# Patient Record
Sex: MALE | Race: WHITE | Employment: FULL TIME | URBAN - METROPOLITAN AREA
[De-identification: names, ages, dates, MRNs, and addresses within clinical notes are randomized per-mention and may not be internally consistent; named-entity substitution may affect disease eponyms.]

---

## 2019-08-18 ENCOUNTER — HOSPITAL ENCOUNTER (EMERGENCY)
Facility: CLINIC | Age: 59
Discharge: HOME | End: 2019-08-18
Admitting: PHYSICIAN ASSISTANT
Payer: COMMERCIAL

## 2019-08-18 VITALS
SYSTOLIC BLOOD PRESSURE: 141 MMHG | HEIGHT: 69 IN | OXYGEN SATURATION: 93 % | WEIGHT: 315 LBS | TEMPERATURE: 98.5 F | DIASTOLIC BLOOD PRESSURE: 96 MMHG | HEART RATE: 94 BPM | BODY MASS INDEX: 46.65 KG/M2 | RESPIRATION RATE: 16 BRPM

## 2019-08-18 DIAGNOSIS — T78.2XXA ANAPHYLAXIS, INITIAL ENCOUNTER: ICD-10-CM

## 2019-08-18 PROCEDURE — 94640 AIRWAY INHALATION TREATMENT: CPT

## 2019-08-18 PROCEDURE — 96372 THER/PROPH/DIAG INJ SC/IM: CPT

## 2019-08-18 PROCEDURE — 25000125 ZZHC RX 250

## 2019-08-18 PROCEDURE — 25000132 ZZH RX MED GY IP 250 OP 250 PS 637: Performed by: PHYSICIAN ASSISTANT

## 2019-08-18 PROCEDURE — 25000131 ZZH RX MED GY IP 250 OP 636 PS 637: Performed by: PHYSICIAN ASSISTANT

## 2019-08-18 PROCEDURE — 99285 EMERGENCY DEPT VISIT HI MDM: CPT | Mod: 25

## 2019-08-18 RX ORDER — EPINEPHRINE 0.3 MG/.3ML
0.3 INJECTION SUBCUTANEOUS
Qty: 2 EACH | Refills: 0 | Status: SHIPPED | OUTPATIENT
Start: 2019-08-18

## 2019-08-18 RX ORDER — PREDNISONE 20 MG/1
40 TABLET ORAL ONCE
Status: COMPLETED | OUTPATIENT
Start: 2019-08-18 | End: 2019-08-18

## 2019-08-18 RX ORDER — FAMOTIDINE 20 MG/1
20 TABLET, FILM COATED ORAL 2 TIMES DAILY
Status: DISCONTINUED | OUTPATIENT
Start: 2019-08-18 | End: 2019-08-18

## 2019-08-18 RX ORDER — IPRATROPIUM BROMIDE AND ALBUTEROL SULFATE 2.5; .5 MG/3ML; MG/3ML
3 SOLUTION RESPIRATORY (INHALATION) ONCE
Status: COMPLETED | OUTPATIENT
Start: 2019-08-18 | End: 2019-08-18

## 2019-08-18 RX ORDER — LORAZEPAM 0.5 MG/1
0.5 TABLET ORAL ONCE
Status: COMPLETED | OUTPATIENT
Start: 2019-08-18 | End: 2019-08-18

## 2019-08-18 RX ORDER — IPRATROPIUM BROMIDE AND ALBUTEROL SULFATE 2.5; .5 MG/3ML; MG/3ML
SOLUTION RESPIRATORY (INHALATION)
Status: COMPLETED
Start: 2019-08-18 | End: 2019-08-18

## 2019-08-18 RX ORDER — PREDNISONE 20 MG/1
40 TABLET ORAL DAILY
Qty: 4 TABLET | Refills: 0 | Status: SHIPPED | OUTPATIENT
Start: 2019-08-18 | End: 2019-08-20

## 2019-08-18 RX ORDER — FAMOTIDINE 20 MG/1
20 TABLET, FILM COATED ORAL ONCE
Status: COMPLETED | OUTPATIENT
Start: 2019-08-18 | End: 2019-08-18

## 2019-08-18 RX ADMIN — EPINEPHRINE 0.3 MG: 1 INJECTION INTRAMUSCULAR; INTRAVENOUS; SUBCUTANEOUS at 18:57

## 2019-08-18 RX ADMIN — LORAZEPAM 0.5 MG: 0.5 TABLET ORAL at 19:08

## 2019-08-18 RX ADMIN — IPRATROPIUM BROMIDE AND ALBUTEROL SULFATE 3 ML: .5; 3 SOLUTION RESPIRATORY (INHALATION) at 19:01

## 2019-08-18 RX ADMIN — PREDNISONE 40 MG: 20 TABLET ORAL at 19:44

## 2019-08-18 RX ADMIN — FAMOTIDINE 20 MG: 20 TABLET, FILM COATED ORAL at 19:44

## 2019-08-18 RX ADMIN — IPRATROPIUM BROMIDE AND ALBUTEROL SULFATE 3 ML: 2.5; .5 SOLUTION RESPIRATORY (INHALATION) at 19:01

## 2019-08-18 ASSESSMENT — MIFFLIN-ST. JEOR: SCORE: 2239.21

## 2019-08-18 ASSESSMENT — ENCOUNTER SYMPTOMS
FACIAL SWELLING: 1
WHEEZING: 1
SHORTNESS OF BREATH: 1
VOMITING: 0
COLOR CHANGE: 1

## 2019-08-18 NOTE — ED AVS SNAPSHOT
Emergency Department  [address] [place] [address]  Phone:  [phone]  Fax:  [phone]                                    Srinivas Muñoz   MRN: [id number]    Department:   Emergency Department   Date of Visit:  8/18/2019           After Visit Summary Signature Page    I have received my discharge instructions, and my questions have been answered. I have discussed any challenges I see with this plan with the nurse or doctor.    ..........................................................................................................................................  Patient/Patient Representative Signature      ..........................................................................................................................................  Patient Representative Print Name and Relationship to Patient    ..................................................               ................................................  Date                                   Time    ..........................................................................................................................................  Reviewed by Signature/Title    ...................................................              ..............................................  Date                                               Time          22EPIC Rev 08/18

## 2019-08-18 NOTE — ED PROVIDER NOTES
History     Chief Complaint:  Allergic Reaction      HPI   Srinivas Muñoz is a 59 year old male diabetic who presents with an allergic reaction.  The patient states that approximately 1 hour ago he was scraping some paint at which time he began to develop some rash.  He subsequently developed some subjective swelling of the lips and throat with wheezing.  He took 4 Benadryl and his Zyrtec prior to coming however this did not significantly improve his symptoms.  He notes that in the past he has had prior allergic reactions when being around paint or dust, however in the past these have never been accompanied by anything besides a rash.  He notes that the swelling of the lips and wheezing are new.  Denies history of asthma.  He is not on any ACE or ARB or beta-blocker.  He denies vomiting, diarrhea, nausea.  He has seen an allergist before diagnosed with being allergic to dust and several other things which he cannot recall.  He was instructed to obtain an EpiPen and carry it but he has not done so.    Allergies:  No Known Drug Allergies      Medications:    Cipro  levocetrizine dihydrochloride  Metformin  Percocet      Past Medical History:    Allergic state  Diabetes  Coronary atherosclerosis   Pterygium of left eye  Chronic urticaria  Asthma    Past Surgical History:    Appendectomy  Vasectomy    Family History:    Diabetes  Stroke  Cancer    Social History:  Smoking Status: Never Smoker  Smokeless Tobacco: Never Used  Alcohol Use: Positive  Drug Use: Negative  PCP: [facility] Myles   Marital Status:          Review of Systems   HENT: Positive for facial swelling.    Respiratory: Positive for shortness of breath and wheezing.    Gastrointestinal: Negative for vomiting.   Skin: Positive for color change and rash.   All other systems reviewed and are negative.    Physical Exam     Patient Vitals for the past 24 hrs:   BP Temp Temp src Pulse Resp SpO2 Height Weight   08/18/19 1920 -- -- -- -- -- 93 % --  "--   08/18/19 1900 (!) 141/96 -- -- 94 -- 93 % -- --   08/18/19 1856 (!) 201/115 98.5  F (36.9  C) Oral 108 16 95 % 1.753 m (5' 9\") 142.9 kg (315 lb)        Physical Exam  General: Alert and cooperative with exam.  Anxious appearing.  Head:  Scalp is NC/AT  Eyes:  No scleral icterus, PERRL  ENT:  The external nose and ears are normal.     There is mild swelling of the lips.  Tongue is not swollen and no posterior oropharynx or uvular swelling.  No stridor in neck.  Neck:  Normal range of motion without rigidity.  CV:  Regular rate and rhythm    No pathologic murmur, rubs, or gallops.  Resp:  Minimal end expiratory wheezing.  Breath sounds otherwise clear bilaterally. Non-labored, no retractions or accessory muscle use  GI:  Abdomen is soft, no distension, no tenderness, no masses. No peritoneal signs.  Bowel sounds present in all quadrants  MS:  No lower extremity edema or asymmetric calf swelling.  Skin:  Diffuse urticarial rash.  Neuro: Oriented. No gross motor deficits.  Psych: Awake. Alert. Normal affect. Appropriate interactions.    Emergency Department Course     Interventions:  1857 epinephrine 0.3 mg IM  1901 Duoneb 3 mL nebulization  1908 Ativan 0.5 mg Oral  1944 Pepcid 20 mg Oral  1944 Deltasone 40 mg Oral    Emergency Department Course:    1853 Nursing notes and vitals reviewed.    1855 I performed an exam of the patient as documented above.     1945 Patient rechecked and updated.  Patient feeling much better. The patient's rash is gone.  Lip swelling and wheezing have also resolved.    2020 The patient is discharged to home.     Impression & Plan      Medical Decision Making:  Srinivas Muñoz is a 59 year old male who presents for signs and symptoms consistent with an allergic reaction.  Based on the severity of presentation, epinephrine was indicated.  Following the administration of epinephrine as well as the above listed medication, the patient was monitored in the emergency department with marked " improvement of all of his symptoms.  I did recommend keeping the patient for monitoring for 3 to 4 hours for rebound reaction however he declined this.  I discussed the risk associated with this, as rebound reactions are most common in the first few hours following administration of epinephrine, however he again declined this.  At the time of discharge, the patient does not have signs of airway compromise and is hemodynamically stable.  Instructions for the use of benadryl and/or zyrtec, H2 blocker, and prednisone were reviewed.  We discussed side effects of elevated blood sugar associated with prednisone and the patient states that his glucose has been very well controlled and he will monitor it well over the next several days.  Return precautions including oral swelling, difficulties breathing, chest pain, syncope were given.  Patient will follow up in approximately 1 week for failure to improve. Epi pen given.  He was driven home by his wife, given administration of Ativan here.      Diagnosis:    ICD-10-CM    1. Anaphylaxis, initial encounter T78.2XXA      Disposition:   Findings and plan explained to the Patient. Patient discharged home with instructions regarding supportive care, medications, and reasons to return. The importance of close follow-up was reviewed.      Discharge Medications:     Review of your medicines         START taking      Dose / Directions   EPINEPHrine 0.3 MG/0.3ML injection 2-pack  Commonly known as:  EPIPEN/ADRENACLICK/or ANY BX GENERIC EQUIV      Dose:  0.3 mg  Inject 0.3 mLs (0.3 mg) into the muscle once as needed for anaphylaxis  Quantity:  2 each  Refills:  0     predniSONE 20 MG tablet  Commonly known as:  DELTASONE      Dose:  40 mg  Take 2 tablets (40 mg) by mouth daily for 2 days  Quantity:  4 tablet  Refills:  0           Where to get your medicines      Some of these will need a paper prescription and others can be bought over the counter. Ask your nurse if you have  questions.    Bring a paper prescription for each of these medications    EPINEPHrine 0.3 MG/0.3ML injection 2-pack    predniSONE 20 MG tablet         Scribe Disclosure:  I, Ezequiel Alexis, am serving as a scribe at 7:05 PM on 8/18/2019 to document services personally performed by Hector Grover PA-C based on my observations and the provider's statements to me.        EMERGENCY DEPARTMENT       Hector Grover PA-C  08/18/19 [id number]

## 2019-08-19 NOTE — DISCHARGE INSTRUCTIONS
We are recommending that you stay for observation for 3 to 4 hours, however you are declining this at the present time. You understand that a rebound reaction could occur for which she should return immediately to the emergency department.      Discharge Instructions  Allergic Reaction    An allergic reaction can result in a rash, itching, swelling, watery eyes, or a runny nose. A serious reaction can cause swelling of your mouth or throat, or difficulty breathing (wheezing). The most serious allergy is called anaphylaxis, and can be life-threatening. Many allergies result in hives, also called urticaria.       An allergy happens when the body s natural defense system (immune system) overreacts to something. The thing that triggers your allergic reaction is called an allergen. The first time you are exposed to your allergen, you may not have any reaction, but the body makes a protein called an antibody. The antibody lets the body recognize and remember the allergen.  Every time you are exposed to your allergen you get more antibody and your reaction can be more severe.      Generally, every Emergency Department visit should have a follow-up clinic visit with either a primary or a specialty clinic/provider. Please follow-up as instructed by your emergency provider today.    Call 911 if you have:  Swelling of the lips, tongue or throat.  Hoarse voice, drooling or trouble breathing.  Chest pain or shortness of breath.  Fainting or unconsciousness.    What can I do to help myself?  If you know what caused your allergy, do not touch it, throw any of it away, and tell others not to have it around you. Wear a medical alert bracelet with a name of your allergen on it.  If you do not know what you are allergic to, keep a journal of everything that you are exposed to (foods, soaps, medicines, etc.). Take this with you when you follow up with your primary provider or specialist (Allergist). This may help determine what is  causing the allergic reaction.  Take any medicines that are prescribed.  Antihistamines can decrease rash or itching. You may use Benadryl  (diphenhydramine) for rash or itching according to package directions, or use a prescription antihistamine as recommended by your provider.  For significant allergic reactions, you may have been given a prescription for an epinephrine (adrenaline) auto injector. Carry this with you at all times! Use it if you are having any symptoms of anaphylaxis.  Do not be afraid to use it. Return to the Emergency Department if you use your auto injector, call 911 if it does not resolve the symptoms. It is only meant to buy time until you can get to the Emergency Department!  If you were given a prescription for medicine here today, be sure to read all of the information (including the package insert) that comes with your prescription.  This will include important information about the medicine, its side effects, and any warnings that you need to know about.  The pharmacist who fills the prescription can provide more information and answer questions you may have about the medicine.  If you have questions or concerns that the pharmacist cannot address, please call or return to the Emergency Department.   Remember that you can always come back to the Emergency Department if you are not able to see your regular provider in the amount of time listed above, if you get any new symptoms, or if there is anything that worries you.

## 2019-09-30 ENCOUNTER — HEALTH MAINTENANCE LETTER (OUTPATIENT)
Age: 59
End: 2019-09-30

## 2020-03-25 ENCOUNTER — HOSPITAL ENCOUNTER (OUTPATIENT)
Facility: CLINIC | Age: 60
End: 2020-03-25
Attending: INTERNAL MEDICINE | Admitting: INTERNAL MEDICINE
Payer: COMMERCIAL

## 2021-01-15 ENCOUNTER — HEALTH MAINTENANCE LETTER (OUTPATIENT)
Age: 61
End: 2021-01-15

## 2021-10-24 ENCOUNTER — HEALTH MAINTENANCE LETTER (OUTPATIENT)
Age: 61
End: 2021-10-24

## 2022-02-13 ENCOUNTER — HEALTH MAINTENANCE LETTER (OUTPATIENT)
Age: 62
End: 2022-02-13

## 2022-10-16 ENCOUNTER — HEALTH MAINTENANCE LETTER (OUTPATIENT)
Age: 62
End: 2022-10-16

## 2023-03-26 ENCOUNTER — HEALTH MAINTENANCE LETTER (OUTPATIENT)
Age: 63
End: 2023-03-26